# Patient Record
Sex: MALE | Race: WHITE | NOT HISPANIC OR LATINO | URBAN - METROPOLITAN AREA
[De-identification: names, ages, dates, MRNs, and addresses within clinical notes are randomized per-mention and may not be internally consistent; named-entity substitution may affect disease eponyms.]

---

## 2022-01-12 ENCOUNTER — NEW PATIENT (OUTPATIENT)
Dept: URBAN - METROPOLITAN AREA CLINIC 14 | Facility: CLINIC | Age: 77
End: 2022-01-12

## 2022-01-12 DIAGNOSIS — L98.8: ICD-10-CM

## 2022-01-12 DIAGNOSIS — H02.831: ICD-10-CM

## 2022-01-12 DIAGNOSIS — H02.835: ICD-10-CM

## 2022-01-12 DIAGNOSIS — H02.834: ICD-10-CM

## 2022-01-12 DIAGNOSIS — H02.832: ICD-10-CM

## 2022-01-12 PROCEDURE — 99203 OFFICE O/P NEW LOW 30 MIN: CPT

## 2022-01-12 ASSESSMENT — VISUAL ACUITY
OD_CC: 20/25
OS_CC: 20/25

## 2022-01-12 NOTE — PATIENT DISCUSSION
MILD DERMATOCHALASIS BOTH UPPER EYELIDS; RECOMMEND COSMETIC UPPER EYELID BLEPHAROPLASTY, OU. I have examined the patient and reviewed  the photos. The patient understands this is cosmetic. We have discussed the risks and benefits of the surgery at length as well as the location of the incision and the recovery process.  The patient understands this discussion, has had all questions answered and desires to proceed with cosmetic blepharoplasty surgery as explained.

## 2022-01-12 NOTE — PATIENT DISCUSSION
RECOMMEND COSMETIC LOWER EYELID BLEPHAROPLASTY W/ CO LASER RESURFACING. The patient is considering repair of their lower eyelid dermatochalasis.  Discussed risks and benefits of a transconjunctival lower eyelid blepharoplasty with laser resurfacing. The patient is aware of risk of prolonged redness from laser resurfacing, which may last up to 6 months in normal cases.  The patient was referred to the website for further information. The patient understands that this portion is cosmetic and will schedule surgery at their convenience.

## 2022-01-12 NOTE — PATIENT DISCUSSION
RECOMMEND FULL FACE CO2 LASER RESURFACING. Discussed the risks and benefits of surgical procedure. The patient expressed understanding, including the risk of prolonged redness from laser resurfacing for up to 6 months for surgical procedure.